# Patient Record
(demographics unavailable — no encounter records)

---

## 2024-11-06 NOTE — ASSESSMENT
[FreeTextEntry1] : 48 yo M with BPH, LUTS  - PVR = 98ml - Natural history of BPH and bladder outlet obstruction reviewed, risks of progression, risks of detrussor myopathy/areflexic bladder, bladder stones, UTI, worsening symptoms, risk of retention and other issues.  All treatment options were reviewed. This included surveillance, all medical therapeutic options, all outlet procedures including office based, TURP, bipolar TURP, button vaporization, thulium/holmium, suprapubic/retropubic simple (open, robotic) prostatectomy.  - Currently with no bothersome LUTS. Will continue observation at this time - PSA screening today - UA, culture

## 2024-11-06 NOTE — HISTORY OF PRESENT ILLNESS
[FreeTextEntry1] : 50 yo M presents with BPH incidentally found on CT scan for epigastric pain  Voiding every 1-2 hours, rarely nocturia no weak flow, no straining but does have incomplete bladder emptying sensation no hematuria, no dysuria Drinks 1-1.5 L of water, 1 cup of cofffee normal bowel movements Father had prostate cancer at age 65 Has never had PSA testing

## 2024-11-06 NOTE — PHYSICAL EXAM
[Normal Appearance] : normal appearance [Well Groomed] : well groomed [General Appearance - In No Acute Distress] : no acute distress [Edema] : no peripheral edema [Respiration, Rhythm And Depth] : normal respiratory rhythm and effort [Exaggerated Use Of Accessory Muscles For Inspiration] : no accessory muscle use [Abdomen Soft] : soft [Abdomen Tenderness] : non-tender [Costovertebral Angle Tenderness] : no ~M costovertebral angle tenderness [Urethral Meatus] : meatus normal [Penis Abnormality] : normal circumcised penis [Urinary Bladder Findings] : the bladder was normal on palpation [Scrotum] : the scrotum was normal [Rectal Exam - Seminal Vesicles] : the seminal vesicles were normal [Epididymis] : the epididymides were normal [Testes Tenderness] : no tenderness of the testes [Testes Mass (___cm)] : there were no testicular masses [Prostate Tenderness] : the prostate was not tender [No Prostate Nodules] : no prostate nodules [Prostate Size ___ gm] : prostate size [unfilled] gm [Normal Station and Gait] : the gait and station were normal for the patient's age [] : no rash [No Focal Deficits] : no focal deficits [Oriented To Time, Place, And Person] : oriented to person, place, and time [Affect] : the affect was normal [Mood] : the mood was normal [No Palpable Adenopathy] : no palpable adenopathy [Chaperone Present] : A chaperone was present in the examining room during all aspects of the physical examination [FreeTextEntry2] : ROSSANA Gonzalez